# Patient Record
Sex: MALE | Race: BLACK OR AFRICAN AMERICAN | NOT HISPANIC OR LATINO | Employment: STUDENT | URBAN - METROPOLITAN AREA
[De-identification: names, ages, dates, MRNs, and addresses within clinical notes are randomized per-mention and may not be internally consistent; named-entity substitution may affect disease eponyms.]

---

## 2017-02-24 ENCOUNTER — HOSPITAL ENCOUNTER (EMERGENCY)
Facility: HOSPITAL | Age: 22
Discharge: HOME | End: 2017-02-24
Attending: EMERGENCY MEDICINE

## 2017-02-24 VITALS
OXYGEN SATURATION: 100 % | SYSTOLIC BLOOD PRESSURE: 114 MMHG | WEIGHT: 170 LBS | BODY MASS INDEX: 22.53 KG/M2 | TEMPERATURE: 98 F | DIASTOLIC BLOOD PRESSURE: 65 MMHG | RESPIRATION RATE: 18 BRPM | HEIGHT: 73 IN | HEART RATE: 78 BPM

## 2017-02-24 DIAGNOSIS — F19.10 DRUG ABUSE: Primary | ICD-10-CM

## 2017-02-24 LAB
ALBUMIN SERPL BCP-MCNC: 4.2 G/DL
ALP SERPL-CCNC: 62 U/L
ALT SERPL W/O P-5'-P-CCNC: 14 U/L
AMORPH CRY URNS QL MICRO: ABNORMAL
AMPHET+METHAMPHET UR QL: NEGATIVE
ANION GAP SERPL CALC-SCNC: 9 MMOL/L
AST SERPL-CCNC: 23 U/L
BACTERIA #/AREA URNS HPF: ABNORMAL /HPF
BARBITURATES UR QL SCN>200 NG/ML: NEGATIVE
BASOPHILS # BLD AUTO: 0.01 K/UL
BASOPHILS NFR BLD: 0.1 %
BENZODIAZ UR QL SCN>200 NG/ML: NEGATIVE
BILIRUB SERPL-MCNC: 0.4 MG/DL
BILIRUB UR QL STRIP: NEGATIVE
BUN SERPL-MCNC: 9 MG/DL
BZE UR QL SCN: NEGATIVE
CALCIUM SERPL-MCNC: 9.4 MG/DL
CANNABINOIDS UR QL SCN: NEGATIVE
CHLORIDE SERPL-SCNC: 107 MMOL/L
CLARITY UR: ABNORMAL
CO2 SERPL-SCNC: 25 MMOL/L
COLOR UR: YELLOW
CREAT SERPL-MCNC: 1 MG/DL
CREAT UR-MCNC: 316.9 MG/DL
DIFFERENTIAL METHOD: ABNORMAL
EOSINOPHIL # BLD AUTO: 0 K/UL
EOSINOPHIL NFR BLD: 0.2 %
ERYTHROCYTE [DISTWIDTH] IN BLOOD BY AUTOMATED COUNT: 14.4 %
EST. GFR  (AFRICAN AMERICAN): >60 ML/MIN/1.73 M^2
EST. GFR  (NON AFRICAN AMERICAN): >60 ML/MIN/1.73 M^2
ETHANOL SERPL-MCNC: <10 MG/DL
GLUCOSE SERPL-MCNC: 103 MG/DL
GLUCOSE UR QL STRIP: NEGATIVE
HCT VFR BLD AUTO: 44.7 %
HGB BLD-MCNC: 15.1 G/DL
HGB UR QL STRIP: NEGATIVE
HYALINE CASTS #/AREA URNS LPF: 1 /LPF
KETONES UR QL STRIP: NEGATIVE
LEUKOCYTE ESTERASE UR QL STRIP: ABNORMAL
LYMPHOCYTES # BLD AUTO: 1.1 K/UL
LYMPHOCYTES NFR BLD: 9.8 %
MCH RBC QN AUTO: 28.4 PG
MCHC RBC AUTO-ENTMCNC: 33.8 %
MCV RBC AUTO: 84 FL
METHADONE UR QL SCN>300 NG/ML: NEGATIVE
MICROSCOPIC COMMENT: ABNORMAL
MONOCYTES # BLD AUTO: 1.1 K/UL
MONOCYTES NFR BLD: 9.9 %
NEUTROPHILS # BLD AUTO: 9.1 K/UL
NEUTROPHILS NFR BLD: 80 %
NITRITE UR QL STRIP: NEGATIVE
OPIATES UR QL SCN: NEGATIVE
PCP UR QL SCN>25 NG/ML: NEGATIVE
PH UR STRIP: 6 [PH] (ref 5–8)
PLATELET # BLD AUTO: 185 K/UL
PMV BLD AUTO: 10.9 FL
POTASSIUM SERPL-SCNC: 4.2 MMOL/L
PROT SERPL-MCNC: 7.3 G/DL
PROT UR QL STRIP: ABNORMAL
RBC # BLD AUTO: 5.32 M/UL
RBC #/AREA URNS HPF: 20 /HPF (ref 0–4)
SODIUM SERPL-SCNC: 141 MMOL/L
SP GR UR STRIP: 1.02 (ref 1–1.03)
TOXICOLOGY INFORMATION: NORMAL
URN SPEC COLLECT METH UR: ABNORMAL
UROBILINOGEN UR STRIP-ACNC: NEGATIVE EU/DL
WBC # BLD AUTO: 11.36 K/UL
WBC #/AREA URNS HPF: 10 /HPF (ref 0–5)

## 2017-02-24 PROCEDURE — 82570 ASSAY OF URINE CREATININE: CPT

## 2017-02-24 PROCEDURE — 93005 ELECTROCARDIOGRAM TRACING: CPT

## 2017-02-24 PROCEDURE — 81000 URINALYSIS NONAUTO W/SCOPE: CPT

## 2017-02-24 PROCEDURE — 85025 COMPLETE CBC W/AUTO DIFF WBC: CPT

## 2017-02-24 PROCEDURE — 80053 COMPREHEN METABOLIC PANEL: CPT

## 2017-02-24 PROCEDURE — 25000003 PHARM REV CODE 250: Performed by: EMERGENCY MEDICINE

## 2017-02-24 PROCEDURE — 96360 HYDRATION IV INFUSION INIT: CPT

## 2017-02-24 PROCEDURE — 80320 DRUG SCREEN QUANTALCOHOLS: CPT

## 2017-02-24 PROCEDURE — 99284 EMERGENCY DEPT VISIT MOD MDM: CPT | Mod: 25

## 2017-02-24 RX ADMIN — SODIUM CHLORIDE 1000 ML: 0.9 INJECTION, SOLUTION INTRAVENOUS at 06:02

## 2017-02-24 NOTE — ED AVS SNAPSHOT
"          [facility]  [place] [address]               Juanjo Hinojosa   2017  5:52 PM   ED    Description:  Male : 1995   Department:  [facility]           Your Care was Coordinated By:     Provider Role From To    Kendell Holman MD Attending Provider 17 [phone] --      Reason for Visit     "smoked mojo"           Diagnoses this Visit        Comments    Drug abuse    -  Primary       ED Disposition     ED Disposition Condition Comment    Discharge  Our goal in the emergency department is to always give you outstanding care and exceptional service. You may receive a survey by mail or e-mail in the next week regarding your experience in our ED. We would greatly appreciate your completing and returnin g the survey. Your feedback provides us with a way to recognize our staff who give very good care and it helps us learn how to improve when your experience was below our aspiration of excellence.              To Do List           Follow-up Information     Follow up with [facility].    Specialty:  Emergency Medicine    Why:  If symptoms worsen    Contact information:    [place] [id number] [phone]      [facility]     [facility] -  Assistance  Registered nurses in the [facility] provide clinical advisement, health education, appointment booking, and other advisory services.  Call for this free service at [phone].             Medications           Message regarding Medications     Verify the changes and/or additions to your medication regime listed below are the same as discussed with your clinician today.  If any of these changes or additions are incorrect, please notify your healthcare provider.        These medications were administered today        Dose Freq    sodium chloride 0.9% bolus 1,000 mL 1,000 mL Once    Sig: Inject 1,000 mLs into the " vein once.    Class: Normal    Route: Intravenous           Verify that the below list of medications is an accurate representation of the medications you are currently taking.  If none reported, the list may be blank. If incorrect, please contact your healthcare provider. Carry this list with you in case of emergency.           Current Medications            Clinical Reference Information           Your Vitals Were     BP                   114/65           Allergies as of 2/24/2017     No Known Allergies      Immunizations Administered on Date of Encounter - 2/24/2017     None      ED Micro, Lab, POCT     Start Ordered       Status Ordering Provider    02/24/17 1817 02/24/17 1816  Drug screen panel, emergency  STAT      Final result     02/24/17 1817 02/24/17 1816  Urinalysis - Cath.  Once      Final result     02/24/17 1817 02/24/17 1816  Ethanol  Once      Final result     02/24/17 1816 02/24/17 1816  CBC auto differential  STAT      Final result     02/24/17 1816 02/24/17 1816  Comprehensive metabolic panel  STAT      Final result     02/24/17 1816 02/24/17 1816  Urinalysis Microscopic  Once      Final result       ED Imaging Orders     Start Ordered       Status Ordering Provider    02/24/17 1816 02/24/17 1816  X-Ray Chest AP Portable  1 time imaging      Final result         Discharge Instructions         Drug Abuse  Use and abuse of drugs like marijuana, amphetamines (speed, crank), cocaine, heroin or prescription pain medicines, sedatives and sleeping pills, MDMA, ecstasy, bath salts, PCP, mescaline and LSD may lead to addiction or dependence. Once this happens, you are at greater risk for any of the following:  Social and personal problems  · Craving for the drug and not able to stop using even though you think you want to stop (psychological addiction)  · Drug withdrawal symptoms if you stop taking the drug (physical dependence)  · Loss of job or your family  · Arrest, conviction, and retirement sentence for  possession of an illegal substance or for driving under the influence  Health problems  · Strokes, heart attacks, kidney failure  · Accidental injuries to yourself or others while you are under the influence of the drug (in a car or at home)  · HIV infection (much greater risk if you use IV drugs)  · Skin infections  · Other sexually transmitted disease (STDs such as herpes, chlamydia, gonorrhea, and others)  · Severe and fatal infection of the heart valves (if you use IV drugs)  · Hepatitis B or C  · Death from overdose  Home care  The following suggestions can help you care for yourself at home:  · Admit you have a drug problem. Ask for help from your family and close friends.  · Seek professional help. This could be in the form of individual psychotherapy or counseling. There are also outpatient, inpatient, and residential drug treatment programs.  · Join a self-help group for drug abuse.  · Stay away from friends who abuse drugs or tempt you to continue abusing drugs.  · Eat a balanced diet and start a regular exercise program.  Follow-up care  Follow up with your healthcare provider, or as advised. Contact one of the resources below for help:  · National [place] on Alcoholism and Drug Dependence  www.ncadd.org  781.875.3034  · Narcotics Anonymous  www.na.org  850.658.6130  · National Alcohol and Substance Abuse Information Center (for referral to treatment programs)  www.[employer]  112.434.4205  Call 911  Call 911 if any of the following occur:  · Seizure  · Hard time breathing or slow, irregular breathing  · Chest pain  · Sudden weakness on one side of your body or sudden trouble speaking  · Very drowsy or trouble awakening  · Fainting or loss of consciousness  · Rapid heart rate  · Very slow heart rate  When to seek medical advice  Call your healthcare provider right away if any of these occur:  · Agitation, anxiety, unable to sleep  · Unintended weight loss (more than 10 to 15 pounds over 3  months)  · Fever of 100.4°F (38°C) or higher, or as directed by your healthcare provider  · Shortness of breath  · Cough with colored sputum  · Redness, swelling, or tenderness at an injection site  Date Last Reviewed: 6/1/2016  © 3369-4486 [employer]. [address] [place] [address]. All rights reserved. This information is not intended as a substitute for professional medical care. Always follow your healthcare professional's instructions.          [facility] Sign-Up     Activating your [facility] account is as easy as 1-2-3!     1) Visit [email or web address], select Sign Up Now, enter this activation code and your date of birth, then select Next.  [email or web address]  Expires: 4/10/2017  9:32 PM      2) Create a username and password to use when you visit [facility] in the future and select a security question in case you lose your password and select Next.    3) Enter your e-mail address and click Sign Up!    Additional Information  If you have questions, please e-mail [email or web address] or call [phone] to talk to our [facility] staff. Remember, [facility] is NOT to be used for urgent needs. For medical emergencies, dial 911.         Smoking Cessation     If you would like to quit smoking:   You may be eligible for free services if you are a [place] resident and started smoking cigarettes before September 1, 1988.  Call the Smoking Cessation Trust (SCT) toll free at (710) 386-9759 or (884) 848-1453.   Call 0-112-QUIT-NOW if you do not meet the above criteria.             [facility] complies with applicable Federal civil rights laws and does not discriminate on the basis of race, color, national origin, age, disability, or sex.        Language Assistance Services     ATTENTION: Language assistance services are available, free of charge. Please call [phone].      ATENCIÓN: Si habla español, tiene a grande disposición servicios gratuitos de asistencia lingüística. Llame  al [phone].     SIVA Ý: N?u b?n nói Ti?ng Vi?t, có các d?ch v? h? tr? ngôn ng? mi?n phí dành cho b?n. G?i s? [phone].

## 2017-02-25 NOTE — DISCHARGE INSTRUCTIONS
Drug Abuse  Use and abuse of drugs like marijuana, amphetamines (speed, crank), cocaine, heroin or prescription pain medicines, sedatives and sleeping pills, MDMA, ecstasy, bath salts, PCP, mescaline and LSD may lead to addiction or dependence. Once this happens, you are at greater risk for any of the following:  Social and personal problems  · Craving for the drug and not able to stop using even though you think you want to stop (psychological addiction)  · Drug withdrawal symptoms if you stop taking the drug (physical dependence)  · Loss of job or your family  · Arrest, conviction, and longterm sentence for possession of an illegal substance or for driving under the influence  Health problems  · Strokes, heart attacks, kidney failure  · Accidental injuries to yourself or others while you are under the influence of the drug (in a car or at home)  · HIV infection (much greater risk if you use IV drugs)  · Skin infections  · Other sexually transmitted disease (STDs such as herpes, chlamydia, gonorrhea, and others)  · Severe and fatal infection of the heart valves (if you use IV drugs)  · Hepatitis B or C  · Death from overdose  Home care  The following suggestions can help you care for yourself at home:  · Admit you have a drug problem. Ask for help from your family and close friends.  · Seek professional help. This could be in the form of individual psychotherapy or counseling. There are also outpatient, inpatient, and residential drug treatment programs.  · Join a self-help group for drug abuse.  · Stay away from friends who abuse drugs or tempt you to continue abusing drugs.  · Eat a balanced diet and start a regular exercise program.  Follow-up care  Follow up with your healthcare provider, or as advised. Contact one of the resources below for help:  · National [place] on Alcoholism and Drug Dependence  www.ncadd.org  951.301.6316  · Narcotics Anonymous  www.na.org  733.185.7114  · National Alcohol and Substance  Abuse Information Center (for referral to treatment programs)  www.add[employer]care[employer]  605.270.4465  Call 911  Call 911 if any of the following occur:  · Seizure  · Hard time breathing or slow, irregular breathing  · Chest pain  · Sudden weakness on one side of your body or sudden trouble speaking  · Very drowsy or trouble awakening  · Fainting or loss of consciousness  · Rapid heart rate  · Very slow heart rate  When to seek medical advice  Call your healthcare provider right away if any of these occur:  · Agitation, anxiety, unable to sleep  · Unintended weight loss (more than 10 to 15 pounds over 3 months)  · Fever of 100.4°F (38°C) or higher, or as directed by your healthcare provider  · Shortness of breath  · Cough with colored sputum  · Redness, swelling, or tenderness at an injection site  Date Last Reviewed: 6/1/2016  © 6445-1259 [employer] ([employer]). [address] [place] [address], [place] [address]. All rights reserved. This information is not intended as a substitute for professional medical care. Always follow your healthcare professional's instructions.

## 2017-02-25 NOTE — ED PROVIDER NOTES
"Encounter Date: 2/24/2017    SCRIBE #1 NOTE: I, Lorrie Alfa, am scribing for, and in the presence of,  Kendell Holman MD. I have scribed the following portions of the note - Other sections scribed: HPI, ROS, PE and EKG.       History     Chief Complaint   Patient presents with    "smoked mojo"     pt accompanied by brother; brother states he picked him up at friend's house after he was called because "they said he passed out"; pt currently very lethargic and only oriented to self; brother states friend stated that pt "smoked mojo"     Review of patient's allergies indicates:  No Known Allergies  HPI Comments: CC: "smoked mojo"    HPI: This 21 y.o. Male, accompanied by mother, with medical history of asthma presents to the ED s/p smoking mojo today. Per pt's brother and mother, he smoked "mojo" today while at a friend's home and has not been acting like his normal self. Mother reports giving pt milk to drink and notes that prior to arrival to the ED, pt "was (passed) out" and unable to ambulate. Pt presently denies smoking mojo. He states, "I feel like bugs in my eyes (and ears)". No other associated symptoms. No alleviating factors.         The history is provided by the patient and a parent. No  was used.     Past Medical History:   Diagnosis Date    Asthma      History reviewed. No pertinent surgical history.  History reviewed. No pertinent family history.  Social History   Substance Use Topics    Smoking status: Current Every Day Smoker     Packs/day: 1.00     Types: Cigarettes    Smokeless tobacco: None    Alcohol use Yes      Comment: socially     Review of Systems   Constitutional: Negative for fever.        (+) "smoked mojo"   HENT: Negative for sore throat.         (+) "I feel like bugs in my eyes (and ears)"   Eyes: Negative for photophobia.   Respiratory: Negative for shortness of breath.    Cardiovascular: Negative for chest pain.   Gastrointestinal: Negative for nausea. "   Genitourinary: Negative for dysuria.   Musculoskeletal: Negative for back pain.   Skin: Negative for rash.   Neurological: Negative for weakness.       Physical Exam   Initial Vitals   BP Pulse Resp Temp SpO2   02/24/17 1707 02/24/17 1707 02/24/17 1707 02/24/17 1707 02/24/17 1707   115/65 110 18 98.3 °F (36.8 °C) 93 %     Physical Exam    Nursing note and vitals reviewed.  Constitutional: Vital signs are normal. He appears well-developed and well-nourished. He is active.  Non-toxic appearance. No distress.   Pt is clinically intoxicated.   HENT:   Head: Normocephalic and atraumatic.   Pt has a fever blister present on the lip.   Eyes: EOM are normal. Right conjunctiva is injected. Left conjunctiva is injected.   Neck: Trachea normal. Neck supple.   Cardiovascular: Normal rate and regular rhythm.   Pulmonary/Chest: Breath sounds normal. No respiratory distress.   Abdominal: Soft. Normal appearance and bowel sounds are normal. He exhibits no distension. There is no tenderness.   Musculoskeletal: Normal range of motion. He exhibits no edema.   Neurological: He is alert.   Skin: Skin is warm, dry and intact.   Psychiatric: He has a normal mood and affect.         ED Course   Procedures  Labs Reviewed   CBC W/ AUTO DIFFERENTIAL - Abnormal; Notable for the following:        Result Value    Gran # 9.1 (*)     Mono # 1.1 (*)     Gran% 80.0 (*)     Lymph% 9.8 (*)     All other components within normal limits   URINALYSIS - Abnormal; Notable for the following:     Appearance, UA Hazy (*)     Protein, UA 2+ (*)     Leukocytes, UA Trace (*)     All other components within normal limits   URINALYSIS MICROSCOPIC - Abnormal; Notable for the following:     RBC, UA 20 (*)     WBC, UA 10 (*)     All other components within normal limits   COMPREHENSIVE METABOLIC PANEL   DRUG SCREEN PANEL, URINE EMERGENCY   ALCOHOL,MEDICAL (ETHANOL)     EKG Readings: (Independently Interpreted)   Initial Reading: No STEMI. Rhythm: Normal Sinus  Rhythm. Heart Rate: 94.   Early repolarization present.          Medical Decision Making:   History:   Old Medical Records: I decided to obtain old medical records.  Clinical Tests:   Lab Tests: Ordered and Reviewed  Radiological Study: Ordered and Reviewed  Medical Tests: Ordered and Reviewed  ED Management:  This is 21 year old male with acute intoxication with MOJO per his mom and brother.  He denies any acute symptoms at this time and denies any other drug use.  He was oversedated and relatively unresponsive which is why he was brought in.  Currently, he is awake but intoxicated.  His labs are negative for drugs or alcohol.  He had 10 WBC in his urine but no symptoms of infection.  Sent a urine cx.  He was treated with IVF.  His mother felt comfortable taking him home to get sober.  He was discharged in stable condition.              Scribe Attestation:   Scribe #1: I performed the above scribed service and the documentation accurately describes the services I performed. I attest to the accuracy of the note.    Attending Attestation:           Physician Attestation for Scribe:  Physician Attestation Statement for Scribe #1: I, Kendell Holman MD, reviewed documentation, as scribed by Lorrie Grimm in my presence, and it is both accurate and complete.                 ED Course     Clinical Impression:   The encounter diagnosis was Drug abuse.    Disposition:   Disposition: Discharged  Condition: Stable       Kendell Holman MD  02/24/17 [id number]

## 2017-02-25 NOTE — ED TRIAGE NOTES
"Pt arrived via personal transportation from home. CC of drug use. Per pt brother "I don't exactly know what he took, i just got a call from his friend that he was trippin out and that they smoked mojo." per pt "I didn't smoke mojo, all i did was smoke cigarettes and drink a lot of alcohol." pt also stated "I feel bugs inside me, like they are everywhere." Pt denies N/V/F/D/SOB. Pt is AAOx3, speech is slightly slurred, but understandable. NAD at this time.  "

## 2017-02-25 NOTE — ED NOTES
Patient is resting comfortably.rise and fall of chest noted symmetrically. No signs of distress noted at this time. Will cont to monitor pt

## 2017-02-25 NOTE — ED NOTES
"Patient standing at the bedside with assistance. Patient unable to urinate. Patient states, "I got somewhere to be. I got to get out of here."   "

## 2020-05-02 DIAGNOSIS — Z20.822 SUSPECTED COVID-19 VIRUS INFECTION: ICD-10-CM

## 2020-05-29 DIAGNOSIS — Z20.822 SUSPECTED COVID-19 VIRUS INFECTION: ICD-10-CM
